# Patient Record
Sex: MALE | Race: ASIAN | Employment: FULL TIME | URBAN - METROPOLITAN AREA
[De-identification: names, ages, dates, MRNs, and addresses within clinical notes are randomized per-mention and may not be internally consistent; named-entity substitution may affect disease eponyms.]

---

## 2017-01-13 PROBLEM — M75.82 ROTATOR CUFF TENDONITIS, LEFT: Status: ACTIVE | Noted: 2017-01-13

## 2017-05-06 PROCEDURE — 82652 VIT D 1 25-DIHYDROXY: CPT | Performed by: FAMILY MEDICINE

## 2017-05-06 PROCEDURE — 82607 VITAMIN B-12: CPT | Performed by: FAMILY MEDICINE

## 2017-08-12 PROCEDURE — 82570 ASSAY OF URINE CREATININE: CPT | Performed by: FAMILY MEDICINE

## 2017-08-12 PROCEDURE — 82043 UR ALBUMIN QUANTITATIVE: CPT | Performed by: FAMILY MEDICINE

## 2017-09-06 PROCEDURE — 82607 VITAMIN B-12: CPT | Performed by: FAMILY MEDICINE

## 2017-09-25 ENCOUNTER — OFFICE VISIT (OUTPATIENT)
Dept: FAMILY MEDICINE CLINIC | Facility: CLINIC | Age: 42
End: 2017-09-25

## 2017-09-25 VITALS
BODY MASS INDEX: 23.84 KG/M2 | WEIGHT: 150.13 LBS | DIASTOLIC BLOOD PRESSURE: 78 MMHG | HEART RATE: 96 BPM | HEIGHT: 66.5 IN | TEMPERATURE: 99 F | SYSTOLIC BLOOD PRESSURE: 114 MMHG

## 2017-09-25 DIAGNOSIS — K21.9 GASTROESOPHAGEAL REFLUX DISEASE WITHOUT ESOPHAGITIS: ICD-10-CM

## 2017-09-25 DIAGNOSIS — E11.9 CONTROLLED TYPE 2 DIABETES MELLITUS WITHOUT COMPLICATION, WITHOUT LONG-TERM CURRENT USE OF INSULIN (HCC): ICD-10-CM

## 2017-09-25 DIAGNOSIS — I10 ESSENTIAL HYPERTENSION, BENIGN: ICD-10-CM

## 2017-09-25 DIAGNOSIS — M25.511 CHRONIC PAIN OF BOTH SHOULDERS: Primary | ICD-10-CM

## 2017-09-25 DIAGNOSIS — M25.512 CHRONIC PAIN OF BOTH SHOULDERS: Primary | ICD-10-CM

## 2017-09-25 DIAGNOSIS — L40.50 PSORIATIC ARTHRITIS (HCC): ICD-10-CM

## 2017-09-25 DIAGNOSIS — G89.29 CHRONIC PAIN OF BOTH SHOULDERS: Primary | ICD-10-CM

## 2017-09-25 DIAGNOSIS — E78.1 HYPERTRIGLYCERIDEMIA: ICD-10-CM

## 2017-09-25 DIAGNOSIS — Z79.1 LONG TERM CURRENT USE OF NON-STEROIDAL ANTI-INFLAMMATORIES (NSAID): ICD-10-CM

## 2017-09-25 PROCEDURE — 99204 OFFICE O/P NEW MOD 45 MIN: CPT | Performed by: FAMILY MEDICINE

## 2017-09-25 RX ORDER — CLOBETASOL PROPIONATE 0.46 MG/ML
SOLUTION TOPICAL
Refills: 1 | COMMUNITY
Start: 2017-09-15 | End: 2017-12-14

## 2017-09-25 NOTE — PROGRESS NOTES
Neena Maurer is a 36year old male. Patient presents with:  Establish Care: New patient here to establish care. Shoulder Pain: One year with bilateral shoulder pain.     HPI:   Patient is seen for initial visit    Complaining of bilateral shoulder pa HISTORY:   History reviewed. No pertinent surgical history.     ALLERGY:   No Known Allergies    MEDICATIONS:       Clobetasol Propionate 0.05 % External Solution APPLY TO AFFECTED AREA ON SCALP AT BEDTIME AS NEEDED ONLY Disp:  Rfl: 1   sulfaSALAzine 500 MG essential hypertension       Social History:  Smoking status: Never Smoker                                                              Smokeless tobacco: Never Used                      Alcohol use:  No                 REVIEW OF SYSTEMS:   GENERAL HEALTH: A1C      4.0 - 5.6 % 5.4   ESTIMATED AVERAGE GLUCOSE      mg/dL 108   Vitamin B12      193 - 986 pg/mL 572     ASSESSMENT AND PLAN:   Doni Topete was seen today for establish care and shoulder pain.     Diagnoses and all orders for this visit:    Chronic pain of

## 2017-09-25 NOTE — PATIENT INSTRUCTIONS
Hold Januvia for 3 months and recheck your labs. Please continue home exercises. Diet: Diabetes  Food is an important tool that you can use to control diabetes and stay healthy.  Eating well-balanced meals in the correct amounts will help you cont · Eat less fat to help lower your risk of heart disease. Use nonfat or low-fat dairy products and lean meats. Avoid fried foods. Use cooking oils that are unsaturated, such as olive, canola, or peanut oil.   · Talk with your dietitian about safe sugar subst

## 2017-11-16 ENCOUNTER — HOSPITAL ENCOUNTER (OUTPATIENT)
Dept: ULTRASOUND IMAGING | Facility: HOSPITAL | Age: 42
Discharge: HOME | End: 2017-11-16
Attending: STUDENT IN AN ORGANIZED HEALTH CARE EDUCATION/TRAINING PROGRAM
Payer: COMMERCIAL

## 2017-11-16 DIAGNOSIS — IMO0002 TRANSAMINASE OR LDH ELEVATION: ICD-10-CM

## 2017-11-16 PROCEDURE — 76700 US EXAM ABDOM COMPLETE: CPT | Performed by: STUDENT IN AN ORGANIZED HEALTH CARE EDUCATION/TRAINING PROGRAM

## 2017-12-04 ENCOUNTER — APPOINTMENT (OUTPATIENT)
Dept: LAB | Age: 42
End: 2017-12-04
Attending: FAMILY MEDICINE
Payer: COMMERCIAL

## 2017-12-04 ENCOUNTER — TELEPHONE (OUTPATIENT)
Dept: FAMILY MEDICINE CLINIC | Facility: CLINIC | Age: 42
End: 2017-12-04

## 2017-12-04 DIAGNOSIS — E11.9 CONTROLLED TYPE 2 DIABETES MELLITUS WITHOUT COMPLICATION, WITHOUT LONG-TERM CURRENT USE OF INSULIN (HCC): ICD-10-CM

## 2017-12-04 PROCEDURE — 83036 HEMOGLOBIN GLYCOSYLATED A1C: CPT

## 2017-12-04 PROCEDURE — 80053 COMPREHEN METABOLIC PANEL: CPT

## 2017-12-04 NOTE — TELEPHONE ENCOUNTER
Placed order for testing supplies    Pt had labs done today with Dr Kanu Jarvis.  Pt was due to repeat labs for Dr Serena Israel in late December    Calling to see if A1C can be added on?

## 2017-12-04 NOTE — TELEPHONE ENCOUNTER
Patient called to R/S annual physical and was told there were no more physical for the rest of the year.   He asked for a refill on medications and Janeth Corado, advised him to call pharmacy for refill.  - Patient then called stating he needs test strips and

## 2017-12-05 ENCOUNTER — TELEPHONE (OUTPATIENT)
Dept: INTERNAL MEDICINE CLINIC | Facility: CLINIC | Age: 42
End: 2017-12-05

## 2017-12-05 DIAGNOSIS — E11.9 TYPE 2 DIABETES MELLITUS WITHOUT COMPLICATION, WITH LONG-TERM CURRENT USE OF INSULIN (HCC): ICD-10-CM

## 2017-12-05 DIAGNOSIS — E78.2 COMBINED HYPERLIPIDEMIA: Primary | ICD-10-CM

## 2017-12-05 DIAGNOSIS — Z79.4 TYPE 2 DIABETES MELLITUS WITHOUT COMPLICATION, WITH LONG-TERM CURRENT USE OF INSULIN (HCC): ICD-10-CM

## 2017-12-05 NOTE — TELEPHONE ENCOUNTER
Spoke with pt, his numbers have been running high in the mornings but he states they lower to the 170's in the evenings.     Pt understands to repeat BMP and lipid before seeing Dr Scales Lies at Physical in January

## 2017-12-05 NOTE — TELEPHONE ENCOUNTER
Spoke with pt, his numbers have been running high in the mornings but he states they lower to the 170's in the evenings.     Pt understands to repeat BMP and lipid before seeing Dr Aurelio Robles at Physical in January    Future Appointments  Date Time Provider

## 2017-12-12 ENCOUNTER — APPOINTMENT (OUTPATIENT)
Dept: LAB | Age: 42
End: 2017-12-12
Attending: FAMILY MEDICINE
Payer: COMMERCIAL

## 2017-12-12 DIAGNOSIS — Z79.4 TYPE 2 DIABETES MELLITUS WITHOUT COMPLICATION, WITH LONG-TERM CURRENT USE OF INSULIN (HCC): ICD-10-CM

## 2017-12-12 DIAGNOSIS — E78.2 COMBINED HYPERLIPIDEMIA: ICD-10-CM

## 2017-12-12 DIAGNOSIS — E11.9 TYPE 2 DIABETES MELLITUS WITHOUT COMPLICATION, WITH LONG-TERM CURRENT USE OF INSULIN (HCC): ICD-10-CM

## 2017-12-12 PROCEDURE — 80053 COMPREHEN METABOLIC PANEL: CPT | Performed by: FAMILY MEDICINE

## 2017-12-12 PROCEDURE — 80061 LIPID PANEL: CPT | Performed by: FAMILY MEDICINE

## 2017-12-12 PROCEDURE — 36415 COLL VENOUS BLD VENIPUNCTURE: CPT | Performed by: FAMILY MEDICINE

## 2017-12-14 ENCOUNTER — OFFICE VISIT (OUTPATIENT)
Dept: FAMILY MEDICINE CLINIC | Facility: CLINIC | Age: 42
End: 2017-12-14

## 2017-12-14 VITALS
HEART RATE: 98 BPM | SYSTOLIC BLOOD PRESSURE: 116 MMHG | WEIGHT: 149 LBS | TEMPERATURE: 99 F | DIASTOLIC BLOOD PRESSURE: 70 MMHG | BODY MASS INDEX: 23.67 KG/M2 | RESPIRATION RATE: 18 BRPM | HEIGHT: 66.5 IN

## 2017-12-14 DIAGNOSIS — L40.9 PSORIASIS: ICD-10-CM

## 2017-12-14 DIAGNOSIS — K21.9 GASTROESOPHAGEAL REFLUX DISEASE WITHOUT ESOPHAGITIS: ICD-10-CM

## 2017-12-14 DIAGNOSIS — Z23 NEED FOR VACCINATION: ICD-10-CM

## 2017-12-14 DIAGNOSIS — E78.2 MIXED HYPERLIPIDEMIA: ICD-10-CM

## 2017-12-14 DIAGNOSIS — Z00.00 ROUTINE GENERAL MEDICAL EXAMINATION AT A HEALTH CARE FACILITY: Primary | ICD-10-CM

## 2017-12-14 DIAGNOSIS — E11.9 CONTROLLED TYPE 2 DIABETES MELLITUS WITHOUT COMPLICATION, WITHOUT LONG-TERM CURRENT USE OF INSULIN (HCC): ICD-10-CM

## 2017-12-14 DIAGNOSIS — I10 ESSENTIAL HYPERTENSION, BENIGN: ICD-10-CM

## 2017-12-14 PROCEDURE — 99396 PREV VISIT EST AGE 40-64: CPT | Performed by: FAMILY MEDICINE

## 2017-12-14 PROCEDURE — 99214 OFFICE O/P EST MOD 30 MIN: CPT | Performed by: FAMILY MEDICINE

## 2017-12-14 PROCEDURE — 90471 IMMUNIZATION ADMIN: CPT | Performed by: FAMILY MEDICINE

## 2017-12-14 PROCEDURE — 90670 PCV13 VACCINE IM: CPT | Performed by: FAMILY MEDICINE

## 2017-12-14 RX ORDER — ENALAPRIL MALEATE 2.5 MG/1
TABLET ORAL
Qty: 90 TABLET | Refills: 1 | Status: SHIPPED | OUTPATIENT
Start: 2017-12-14

## 2017-12-14 RX ORDER — CLOBETASOL PROPIONATE 0.46 MG/ML
SOLUTION TOPICAL
Qty: 25 ML | Refills: 2 | Status: SHIPPED | OUTPATIENT
Start: 2017-12-14

## 2017-12-14 RX ORDER — OMEPRAZOLE 40 MG/1
CAPSULE, DELAYED RELEASE ORAL
Qty: 90 CAPSULE | Refills: 1 | Status: SHIPPED | OUTPATIENT
Start: 2017-12-14

## 2017-12-14 RX ORDER — MOMETASONE FUROATE 1 MG/G
1 CREAM TOPICAL DAILY
Qty: 15 G | Refills: 2 | Status: SHIPPED | OUTPATIENT
Start: 2017-12-14 | End: 2018-01-13

## 2017-12-14 NOTE — PATIENT INSTRUCTIONS
Prevention Guidelines, Men Ages 36 to 52  Screening tests and vaccines are an important part of managing your health. Health counseling is essential, too. Below are guidelines for these, for men ages 36 to 52.  Talk with your healthcare provider to make s Chickenpox (varicella) All men in this age group who have no record of this infection or vaccine 2 doses; the second dose should be given at least 4 weeks after the first dose   Hepatitis A Men at increased risk for infection – talk with your healthcare pr Use of tobacco and the health effects it can cause All men in this age group Every exam   [facility]  Date Last Reviewed: 2/1/2017  © 8330-7840 [employer].  [address] [facility] · Eat only the amount of food in your meal plan. Eat about the same amount of food at regular times each day. Don’t skip meals. Eat meals 4 to 5 hours apart, with snacks in between. · Limit alcohol. It raises blood sugar levels.  Drink water or calorie-bernabe

## 2017-12-15 ENCOUNTER — APPOINTMENT (OUTPATIENT)
Dept: LAB | Age: 42
End: 2017-12-15
Attending: FAMILY MEDICINE
Payer: COMMERCIAL

## 2017-12-15 DIAGNOSIS — Z00.00 ROUTINE GENERAL MEDICAL EXAMINATION AT A HEALTH CARE FACILITY: ICD-10-CM

## 2017-12-15 PROCEDURE — 82306 VITAMIN D 25 HYDROXY: CPT | Performed by: FAMILY MEDICINE

## 2017-12-15 PROCEDURE — 36415 COLL VENOUS BLD VENIPUNCTURE: CPT | Performed by: FAMILY MEDICINE

## 2017-12-15 PROCEDURE — 82607 VITAMIN B-12: CPT | Performed by: FAMILY MEDICINE

## 2017-12-15 PROCEDURE — 84443 ASSAY THYROID STIM HORMONE: CPT | Performed by: FAMILY MEDICINE

## 2017-12-17 NOTE — PROGRESS NOTES
Samuel Mccloud is a 36year old male   Patient presents with:   Well Adult: Physical    HPI:   Patient is seen for routine annual physical.    HTN: Blood pressure has been well controlled and patient would like a refill on his medications, julian Fontaine topically daily. Disp: 15 g Rfl: 2   Apremilast (OTEZLA) 30 MG Oral Tab One tab every 12 hours.  Take after starter Disp: 180 tablet Rfl: 0   Glucose Blood In Vitro Strip Test two times daily Disp: 200 each Rfl: 4   sulfaSALAzine 500 MG Oral Tab EC Take 3 polydipsia, polyphagia. Respiratory: No cough, shortness of breath, dyspnea on exertion, wheezing. Cardiovascular: No heart palpitations, irregular heartbeat, faintness or syncope, no chest pressure or chest pain on exertion. No edema or varicose veins. barefoot skin diabetic exam is normal, visualized feet and the appearance is normal.  Bilateral monofilament/sensation of both feet is normal.  Pulsation pedal pulse exam of both lower legs/feet is normal as well.      Pulses:   2+ and symmetric all extremi Future    Psoriasis  -     Clobetasol Propionate 0.05 % External Solution; APPLY TO AFFECTED AREA ON SCALP AT BEDTIME AS NEEDED ONLY  -     Mometasone Furoate 0.1 % External Cream; Apply 1 Application topically daily.     Controlled type 2 diabetes mellitus and flexibility. Advance directive information available at http://[email or web address] [place].state. [place]/public/books/advin.htm. If you complete a living will/health care power of , please bring us a copy. Recheck in 1 year or as needed.

## 2017-12-20 ENCOUNTER — TELEPHONE (OUTPATIENT)
Dept: FAMILY MEDICINE CLINIC | Facility: CLINIC | Age: 42
End: 2017-12-20

## 2017-12-20 NOTE — TELEPHONE ENCOUNTER
Agreed, pt may hold his dose of metformin in the morning if he is NPO.  He may take it after the procedure with his meal.

## 2017-12-20 NOTE — TELEPHONE ENCOUNTER
Scheduled to have Endoscopy tomorrow at 2:30 pm.  Pt has questions about medication and if he can take. [facility] (he believes [facility] Nurse) told him to contact his PCP regarding his Diabetic Medication.     If he ponce

## 2017-12-20 NOTE — TELEPHONE ENCOUNTER
Should be fine not eating for Endoscopy test.      Spoke to patient he will be NPO  4 hours before test at 2:30p. Advised not to take his Diabetic Rx until test done and to take with food. He says when he doesn't eat he gets the shakes and a headache.

## 2017-12-22 ENCOUNTER — HOSPITAL ENCOUNTER (OUTPATIENT)
Dept: NUCLEAR MEDICINE | Facility: HOSPITAL | Age: 42
Discharge: HOME | End: 2017-12-22
Attending: STUDENT IN AN ORGANIZED HEALTH CARE EDUCATION/TRAINING PROGRAM
Payer: COMMERCIAL

## 2017-12-22 DIAGNOSIS — K21.9 GASTROESOPHAGEAL REFLUX DISEASE WITHOUT ESOPHAGITIS: ICD-10-CM

## 2017-12-22 PROCEDURE — 78264 GASTRIC EMPTYING IMG STUDY: CPT | Performed by: STUDENT IN AN ORGANIZED HEALTH CARE EDUCATION/TRAINING PROGRAM

## 2018-03-12 ENCOUNTER — LAB ENCOUNTER (OUTPATIENT)
Dept: LAB | Facility: HOSPITAL | Age: 43
End: 2018-03-12
Attending: INTERNAL MEDICINE
Payer: COMMERCIAL

## 2018-03-12 DIAGNOSIS — E11.9 CONTROLLED TYPE 2 DIABETES MELLITUS WITHOUT COMPLICATION, WITHOUT LONG-TERM CURRENT USE OF INSULIN (HCC): ICD-10-CM

## 2018-03-12 DIAGNOSIS — E78.2 MIXED HYPERLIPIDEMIA: ICD-10-CM

## 2018-03-12 DIAGNOSIS — Z79.1 LONG TERM (CURRENT) USE OF NON-STEROIDAL ANTI-INFLAMMATORIES (NSAID): ICD-10-CM

## 2018-03-12 DIAGNOSIS — K30 UPSET STOMACH: ICD-10-CM

## 2018-03-12 DIAGNOSIS — Z79.899 LONG-TERM USE OF HIGH-RISK MEDICATION: ICD-10-CM

## 2018-03-12 DIAGNOSIS — T88.9XXA SIDE EFFECTS OF TREATMENT, INITIAL ENCOUNTER: ICD-10-CM

## 2018-03-12 DIAGNOSIS — L40.50 PSORIATIC ARTHRITIS (HCC): ICD-10-CM

## 2018-03-12 DIAGNOSIS — M79.89 TOE SWELLING: ICD-10-CM

## 2018-03-12 LAB
ALBUMIN SERPL-MCNC: 3.7 G/DL (ref 3.5–4.8)
ALP LIVER SERPL-CCNC: 88 U/L (ref 45–117)
ALT SERPL-CCNC: 51 U/L (ref 17–63)
AST SERPL-CCNC: 21 U/L (ref 15–41)
BASOPHILS # BLD AUTO: 0.08 X10(3) UL (ref 0–0.1)
BASOPHILS NFR BLD AUTO: 1.3 %
BILIRUB DIRECT SERPL-MCNC: <0.1 MG/DL (ref 0.1–0.5)
BILIRUB SERPL-MCNC: 0.4 MG/DL (ref 0.1–2)
BUN BLD-MCNC: 12 MG/DL (ref 8–20)
C-REACTIVE PROTEIN: 0.31 MG/DL (ref ?–1)
CALCIUM BLD-MCNC: 8.6 MG/DL (ref 8.3–10.3)
CHLORIDE: 105 MMOL/L (ref 101–111)
CHOLEST SMN-MCNC: 149 MG/DL (ref ?–200)
CO2: 26 MMOL/L (ref 22–32)
CREAT BLD-MCNC: 1.13 MG/DL (ref 0.7–1.3)
CREAT UR-SCNC: 187 MG/DL
EOSINOPHIL # BLD AUTO: 0.56 X10(3) UL (ref 0–0.3)
EOSINOPHIL NFR BLD AUTO: 8.8 %
ERYTHROCYTE [DISTWIDTH] IN BLOOD BY AUTOMATED COUNT: 12.2 % (ref 11.5–16)
EST. AVERAGE GLUCOSE BLD GHB EST-MCNC: 166 MG/DL (ref 68–126)
GLUCOSE BLD-MCNC: 146 MG/DL (ref 70–99)
HBA1C MFR BLD HPLC: 7.4 % (ref ?–5.7)
HCT VFR BLD AUTO: 48.6 % (ref 37–53)
HDLC SERPL-MCNC: 31 MG/DL (ref 45–?)
HDLC SERPL: 4.81 {RATIO} (ref ?–4.97)
HGB BLD-MCNC: 15.8 G/DL (ref 13–17)
IMMATURE GRANULOCYTE COUNT: 0.03 X10(3) UL (ref 0–1)
IMMATURE GRANULOCYTE RATIO %: 0.5 %
LDLC SERPL CALC-MCNC: 64 MG/DL (ref ?–130)
LYMPHOCYTES # BLD AUTO: 2.75 X10(3) UL (ref 0.9–4)
LYMPHOCYTES NFR BLD AUTO: 43 %
M PROTEIN MFR SERPL ELPH: 7.3 G/DL (ref 6.1–8.3)
MCH RBC QN AUTO: 28.6 PG (ref 27–33.2)
MCHC RBC AUTO-ENTMCNC: 32.5 G/DL (ref 31–37)
MCV RBC AUTO: 88 FL (ref 80–99)
MICROALBUMIN UR-MCNC: 0.95 MG/DL
MICROALBUMIN/CREAT 24H UR-RTO: 5.1 UG/MG (ref ?–30)
MONOCYTES # BLD AUTO: 0.76 X10(3) UL (ref 0.1–1)
MONOCYTES NFR BLD AUTO: 11.9 %
NEUTROPHIL ABS PRELIM: 2.21 X10 (3) UL (ref 1.3–6.7)
NEUTROPHILS # BLD AUTO: 2.21 X10(3) UL (ref 1.3–6.7)
NEUTROPHILS NFR BLD AUTO: 34.5 %
NONHDLC SERPL-MCNC: 118 MG/DL (ref ?–130)
PLATELET # BLD AUTO: 249 10(3)UL (ref 150–450)
POTASSIUM SERPL-SCNC: 4 MMOL/L (ref 3.6–5.1)
RBC # BLD AUTO: 5.52 X10(6)UL (ref 4.3–5.7)
RED CELL DISTRIBUTION WIDTH-SD: 39.2 FL (ref 35.1–46.3)
SED RATE-ML: 4 MM/HR (ref 0–12)
SODIUM SERPL-SCNC: 139 MMOL/L (ref 136–144)
TRIGL SERPL-MCNC: 269 MG/DL (ref ?–150)
VLDLC SERPL CALC-MCNC: 54 MG/DL (ref 5–40)
WBC # BLD AUTO: 6.4 X10(3) UL (ref 4–13)

## 2018-03-12 PROCEDURE — 80053 COMPREHEN METABOLIC PANEL: CPT

## 2018-03-12 PROCEDURE — 85652 RBC SED RATE AUTOMATED: CPT

## 2018-03-12 PROCEDURE — 82248 BILIRUBIN DIRECT: CPT

## 2018-03-12 PROCEDURE — 82570 ASSAY OF URINE CREATININE: CPT

## 2018-03-12 PROCEDURE — 83036 HEMOGLOBIN GLYCOSYLATED A1C: CPT

## 2018-03-12 PROCEDURE — 82043 UR ALBUMIN QUANTITATIVE: CPT

## 2018-03-12 PROCEDURE — 86140 C-REACTIVE PROTEIN: CPT

## 2018-03-12 PROCEDURE — 80061 LIPID PANEL: CPT

## 2018-03-12 PROCEDURE — 36415 COLL VENOUS BLD VENIPUNCTURE: CPT

## 2018-03-12 PROCEDURE — 85025 COMPLETE CBC W/AUTO DIFF WBC: CPT

## 2018-03-12 NOTE — PROGRESS NOTES
Laboratory testing is within acceptable monitoring ranges, continue current treatment  Inflammation labs normal.  Normal liver enzymes  hbAIC ( 3 month sugar average ) is higher and elevated Triglycerides; will forward this to your primary MD

## 2018-03-16 ENCOUNTER — OFFICE VISIT (OUTPATIENT)
Dept: FAMILY MEDICINE CLINIC | Facility: CLINIC | Age: 43
End: 2018-03-16

## 2018-03-16 VITALS
BODY MASS INDEX: 23.7 KG/M2 | OXYGEN SATURATION: 99 % | TEMPERATURE: 98 F | RESPIRATION RATE: 18 BRPM | WEIGHT: 151 LBS | SYSTOLIC BLOOD PRESSURE: 118 MMHG | HEART RATE: 104 BPM | HEIGHT: 67 IN | DIASTOLIC BLOOD PRESSURE: 78 MMHG

## 2018-03-16 DIAGNOSIS — E11.9 CONTROLLED TYPE 2 DIABETES MELLITUS WITHOUT COMPLICATION, WITHOUT LONG-TERM CURRENT USE OF INSULIN (HCC): Primary | ICD-10-CM

## 2018-03-16 DIAGNOSIS — R41.3 MEMORY DIFFICULTY: ICD-10-CM

## 2018-03-16 DIAGNOSIS — I10 ESSENTIAL HYPERTENSION, BENIGN: ICD-10-CM

## 2018-03-16 PROCEDURE — 99213 OFFICE O/P EST LOW 20 MIN: CPT | Performed by: FAMILY MEDICINE

## 2018-03-16 RX ORDER — GLIMEPIRIDE 1 MG/1
1 TABLET ORAL 2 TIMES DAILY
Qty: 180 TABLET | Refills: 0 | Status: SHIPPED | OUTPATIENT
Start: 2018-03-16 | End: 2018-06-14

## 2018-03-16 RX ORDER — OMEPRAZOLE 40 MG/1
CAPSULE, DELAYED RELEASE ORAL
Qty: 90 CAPSULE | Refills: 1 | Status: CANCELLED | OUTPATIENT
Start: 2018-03-16

## 2018-03-16 RX ORDER — ENALAPRIL MALEATE 2.5 MG/1
TABLET ORAL
Qty: 90 TABLET | Refills: 1 | Status: CANCELLED | OUTPATIENT
Start: 2018-03-16

## 2018-03-16 NOTE — PROGRESS NOTES
Dougie Agosto is a 39year old male. Patient presents with:  Diabetes: f/u for medications, patient has not done his diabetic eye exam.    HPI:   Diabetes: Patient is seen for follow-up and medication refill, compliant with medication.   States did do Each Rfl: 3   Clobetasol Propionate 0.05 % External Solution APPLY TO AFFECTED AREA ON SCALP AT BEDTIME AS NEEDED ONLY Disp: 25 mL Rfl: 2        REVIEW OF SYSTEMS:   GENERAL HEALTH: feels well otherwise  SKIN: denies any unusual skin lesions or rashes  RES

## 2018-03-16 NOTE — PATIENT INSTRUCTIONS
Your lapses in memory could be due to acute stress. Please start the new medication for diabetes and take it 15 minutes before you eat to prevent hypoglycemia.       Diet: Diabetes  Food is an important tool that you can use to control diabetes and stay · Eat less fat to help lower your risk of heart disease. Use nonfat or low-fat dairy products and lean meats. Avoid fried foods. Use cooking oils that are unsaturated, such as olive, canola, or peanut oil.   · Talk with your dietitian about safe sugar subst

## 2018-05-25 ENCOUNTER — TELEPHONE (OUTPATIENT)
Dept: FAMILY MEDICINE CLINIC | Facility: CLINIC | Age: 43
End: 2018-05-25

## 2018-05-25 NOTE — TELEPHONE ENCOUNTER
Pt's spouse called. They have moved to [facility]. Effective 5-1-2018. Updated both charts with their new address for billing purposes. Completed Removal Request Form.

## 2018-08-16 DIAGNOSIS — I10 ESSENTIAL HYPERTENSION, BENIGN: ICD-10-CM

## 2018-08-17 NOTE — TELEPHONE ENCOUNTER
LOV  3/18    LAST LAB 3/18    LAST RX   Enalapril Maleate 2.5 MG Oral Tab 90 tablet 1 12/14/2017         Next OV Visit date not found    PROTOCOL  Hypertension Medications Protocol Passed    Primary Care Provider  Rochelle Cash MD (May 30, 2018 - [place]

## 2018-08-20 RX ORDER — ENALAPRIL MALEATE 2.5 MG/1
TABLET ORAL
Qty: 90 TABLET | Refills: 0 | OUTPATIENT
Start: 2018-08-20

## 2019-01-31 ENCOUNTER — TELEPHONE (OUTPATIENT)
Dept: FAMILY MEDICINE CLINIC | Facility: CLINIC | Age: 44
End: 2019-01-31

## 2019-01-31 NOTE — TELEPHONE ENCOUNTER
Need to do PCP removal.    Primary Care Provider  Bao Pitt MD (May 30, 2018 - Present)  [phone] (Work)  [phone] (Fax)  Sofia Galarza [address] [place] [address]

## (undated) NOTE — LETTER
07/20/18        Fabiola Juárez [facility] [address] [place] [address]      Dear Cecy Eng records indicate that you have outstanding lab work and or testing that was ordered for you and has not yet been completed:          Comp Metabolic Panel (14